# Patient Record
(demographics unavailable — no encounter records)

---

## 2025-03-04 NOTE — BEGINNING OF VISIT
[0] : 2) Feeling down, depressed, or hopeless: Not at all (0) [PHQ-2 Negative] : PHQ-2 Negative [[id number]] : 0 [Pain Scale: ___] : On a scale of 1-10, today the patient's pain is a(n) [unfilled]. [Never] : Never [Date Discussed (MM/DD/YY): ___] : Discussed: [unfilled] [With Patient/Caregiver] : with Patient/Caregiver

## 2025-03-04 NOTE — BEGINNING OF VISIT
[0] : 2) Feeling down, depressed, or hopeless: Not at all (0) [PHQ-2 Negative] : PHQ-2 Negative [[id number]] : 0 [Pain Scale: ___] : On a scale of 1-10, today the patient's pain is a(n) [unfilled]. [Never] : Never [Date Discussed (MM/DD/YY): ___] : Discussed: [unfilled] [With Patient/Caregiver] : with Patient/Caregiver

## 2025-03-04 NOTE — BEGINNING OF VISIT
[0] : 2) Feeling down, depressed, or hopeless: Not at all (0) [PHQ-2 Negative] : PHQ-2 Negative [[id number]] : 0 [Pain Scale: ___] : On a scale of 1-10, today the patient's pain is a(n) [unfilled]. [Never] : Never [Date Discussed (MM/DD/YY): ___] : Discussed: [unfilled] [With Patient/Caregiver] : with Patient/Caregiver

## 2025-03-06 NOTE — HISTORY OF PRESENT ILLNESS
[de-identified] : 22 year old white male referred for mild thrombocytosis present for few years . IN 2018 , he underwent work up for mild non-specific complaints of neck , arm and back pain , mild arthralgias responding to NSAIDs. negative rheumatologic work up and normal ESR . He is currently asymptomatic except for mild knee pain , he is physically active , plays basketball and works out regularly . \par  SYstem review : He denies fever , night sweats , weight loss , diarrhea, abdominal pain , rash , headaches , abnormal bleeding or clotting . cyanosis , erythromelalgia , itching , headaches or migraine , arthritis. visual symptoms or dizziness. hematochezia. \par  FH : non-contributory \par  SH : drinks socially , non-smoker.  [de-identified] : 3/4/2025 Patient returns after a long hiatus referred by Dr Andrea for persistent thrombocytosis , Work upin 20

## 2025-03-06 NOTE — HISTORY OF PRESENT ILLNESS
[de-identified] : 22 year old white male referred for mild thrombocytosis present for few years . IN 2018 , he underwent work up for mild non-specific complaints of neck , arm and back pain , mild arthralgias responding to NSAIDs. negative rheumatologic work up and normal ESR . He is currently asymptomatic except for mild knee pain , he is physically active , plays basketball and works out regularly . \par  SYstem review : He denies fever , night sweats , weight loss , diarrhea, abdominal pain , rash , headaches , abnormal bleeding or clotting . cyanosis , erythromelalgia , itching , headaches or migraine , arthritis. visual symptoms or dizziness. hematochezia. \par  FH : non-contributory \par  SH : drinks socially , non-smoker.  [de-identified] : 3/4/2025 Patient returns after a long hiatus referred by Dr Andrea for persistent thrombocytosis , Work upin 20

## 2025-03-06 NOTE — ASSESSMENT
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  Plan : repeat cbc , CMP , LDH , JAK2            spleen ultrasound          the diagnosis , natural history of the disease was discussed , no indication for cytoreductive treatment at this point in the absence of disease related symptoms , history of thrombosis or bleeding ,will continue to follow expectantly and consider low dose ASA prophylaxis.. Emphasized maintaining healthy life style , mitigate any cardiovascular risk factors including , smoking , hyperlipidemia .

## 2025-03-06 NOTE — HISTORY OF PRESENT ILLNESS
[de-identified] : 22 year old white male referred for mild thrombocytosis present for few years . IN 2018 , he underwent work up for mild non-specific complaints of neck , arm and back pain , mild arthralgias responding to NSAIDs. negative rheumatologic work up and normal ESR . He is currently asymptomatic except for mild knee pain , he is physically active , plays basketball and works out regularly . \par  SYstem review : He denies fever , night sweats , weight loss , diarrhea, abdominal pain , rash , headaches , abnormal bleeding or clotting . cyanosis , erythromelalgia , itching , headaches or migraine , arthritis. visual symptoms or dizziness. hematochezia. \par  FH : non-contributory \par  SH : drinks socially , non-smoker.  [de-identified] : 3/4/2025 Patient returns after a long hiatus referred by Dr Andrea for persistent thrombocytosis , Work upin 20

## 2025-04-08 NOTE — ASSESSMENT
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  spleen ultrasound mild splenomegaly measuring 15.6 cm.  Plan : repeat cbc , CMP , LDH.                   the diagnosis , natural history of the disease was discussed , no indication for cytoreductive treatment at this point in the absence of disease related symptoms , history of thrombosis or bleeding ,will continue to follow expectantly and consider low dose ASA prophylaxis.. Emphasized maintaining healthy life style , mitigate any cardiovascular risk factors including , smoking , hyperlipidemia . Offered to perform a bone marrow biopsy given he was diagnosed with ET at a young age  it will help to determine if there is any Myelofibrosis. He will think about it and will let us know.

## 2025-04-08 NOTE — HISTORY OF PRESENT ILLNESS
[de-identified] : 22 year old white male referred for mild thrombocytosis present for few years . IN 2018 , he underwent work up for mild non-specific complaints of neck , arm and back pain , mild arthralgias responding to NSAIDs. negative rheumatologic work up and normal ESR . He is currently asymptomatic except for mild knee pain , he is physically active , plays basketball and works out regularly . \par  SYstem review : He denies fever , night sweats , weight loss , diarrhea, abdominal pain , rash , headaches , abnormal bleeding or clotting . cyanosis , erythromelalgia , itching , headaches or migraine , arthritis. visual symptoms or dizziness. hematochezia. \par  FH : non-contributory \par  SH : drinks socially , non-smoker.  [de-identified] : 3/4/2025 Patient returns after a long hiatus referred by Dr Andrea for persistent thrombocytosis , Work upin 20  4/8/2025 Patient returns after a long hiatus referred by Dr Andrea for persistent thrombocytosis, Work up in 2022 showed positive JAK2. VAF 19.3. No history of blood clots , MI, Stroke in the past. He feels well, no active complaints.

## 2025-04-08 NOTE — HISTORY OF PRESENT ILLNESS
[de-identified] : 22 year old white male referred for mild thrombocytosis present for few years . IN 2018 , he underwent work up for mild non-specific complaints of neck , arm and back pain , mild arthralgias responding to NSAIDs. negative rheumatologic work up and normal ESR . He is currently asymptomatic except for mild knee pain , he is physically active , plays basketball and works out regularly . \par  SYstem review : He denies fever , night sweats , weight loss , diarrhea, abdominal pain , rash , headaches , abnormal bleeding or clotting . cyanosis , erythromelalgia , itching , headaches or migraine , arthritis. visual symptoms or dizziness. hematochezia. \par  FH : non-contributory \par  SH : drinks socially , non-smoker.  [de-identified] : 3/4/2025 Patient returns after a long hiatus referred by Dr Andrea for persistent thrombocytosis , Work upin 20  4/8/2025 Patient returns after a long hiatus referred by Dr Andrea for persistent thrombocytosis, Work up in 2022 showed positive JAK2. VAF 19.3. No history of blood clots , MI, Stroke in the past. He feels well, no active complaints.

## 2025-04-08 NOTE — HISTORY OF PRESENT ILLNESS
[de-identified] : 22 year old white male referred for mild thrombocytosis present for few years . IN 2018 , he underwent work up for mild non-specific complaints of neck , arm and back pain , mild arthralgias responding to NSAIDs. negative rheumatologic work up and normal ESR . He is currently asymptomatic except for mild knee pain , he is physically active , plays basketball and works out regularly . \par  SYstem review : He denies fever , night sweats , weight loss , diarrhea, abdominal pain , rash , headaches , abnormal bleeding or clotting . cyanosis , erythromelalgia , itching , headaches or migraine , arthritis. visual symptoms or dizziness. hematochezia. \par  FH : non-contributory \par  SH : drinks socially , non-smoker.  [de-identified] : 3/4/2025 Patient returns after a long hiatus referred by Dr Andrea for persistent thrombocytosis , Work upin 20  4/8/2025 Patient returns after a long hiatus referred by Dr Andrea for persistent thrombocytosis, Work up in 2022 showed positive JAK2. VAF 19.3. No history of blood clots , MI, Stroke in the past. He feels well, no active complaints.

## 2025-07-10 NOTE — HISTORY OF PRESENT ILLNESS
[de-identified] : 22 year old white male referred for mild thrombocytosis present for few years . IN 2018 , he underwent work up for mild non-specific complaints of neck , arm and back pain , mild arthralgias responding to NSAIDs. negative rheumatologic work up and normal ESR . He is currently asymptomatic except for mild knee pain , he is physically active , plays basketball and works out regularly . \par  SYstem review : He denies fever , night sweats , weight loss , diarrhea, abdominal pain , rash , headaches , abnormal bleeding or clotting . cyanosis , erythromelalgia , itching , headaches or migraine , arthritis. visual symptoms or dizziness. hematochezia. \par  FH : non-contributory \par  SH : drinks socially , non-smoker.  [de-identified] : 3/4/2025 Patient returns after a long hiatus referred by Dr Andrea for persistent thrombocytosis , Work upin 20  4/8/2025 Patient returns after a long hiatus referred by Dr Andrea for persistent thrombocytosis, Work up in 2022 showed positive JAK2. VAF 19.3. No history of blood clots , MI, Stroke in the past. He feels well, no active complaints.   67/7/25 Patient returns for follow up, patient of Dr. Andrea. Work up in 2022 showed positive JAK2. VAF 19.3. No history of blood clots, MI, Stroke in the past. He feels well, no active complaints.  reporting chest discomfort with exertion. CBC today showing platelet count 470K.

## 2025-07-10 NOTE — HISTORY OF PRESENT ILLNESS
[de-identified] : 22 year old white male referred for mild thrombocytosis present for few years . IN 2018 , he underwent work up for mild non-specific complaints of neck , arm and back pain , mild arthralgias responding to NSAIDs. negative rheumatologic work up and normal ESR . He is currently asymptomatic except for mild knee pain , he is physically active , plays basketball and works out regularly . \par  SYstem review : He denies fever , night sweats , weight loss , diarrhea, abdominal pain , rash , headaches , abnormal bleeding or clotting . cyanosis , erythromelalgia , itching , headaches or migraine , arthritis. visual symptoms or dizziness. hematochezia. \par  FH : non-contributory \par  SH : drinks socially , non-smoker.  [de-identified] : 3/4/2025 Patient returns after a long hiatus referred by Dr Andrea for persistent thrombocytosis , Work upin 20  4/8/2025 Patient returns after a long hiatus referred by Dr Andrea for persistent thrombocytosis, Work up in 2022 showed positive JAK2. VAF 19.3. No history of blood clots , MI, Stroke in the past. He feels well, no active complaints.   67/7/25 Patient returns for follow up, patient of Dr. Andrea. Work up in 2022 showed positive JAK2. VAF 19.3. No history of blood clots, MI, Stroke in the past. He feels well, no active complaints.  reporting chest discomfort with exertion. CBC today showing platelet count 470K.

## 2025-07-10 NOTE — ASSESSMENT
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  spleen ultrasound mild splenomegaly measuring 15.6 cm.  Essential thrombocythemia, Low Risk  The diagnosis , natural history of the disease was discussed , no indication for cytoreductive treatment at this point in the absence of disease related symptoms , history of thrombosis or bleeding ,will continue to follow expectantly and consider low dose ASA prophylaxis. Emphasized maintaining healthy life style , mitigate any cardiovascular risk factors including , smoking , hyperlipidemia . Offered to perform a bone marrow biopsy given he was diagnosed with ET at a young age  it will help to determine if there is any Myelofibrosis. -reporting chest discomfort with exertion, cardiology referral given  - advised to start Aspirin 81 mg QD given he has low risk ET (Age <60, JAK2 [id number], and no history of thrombosis)   Plan :  RTC in 6 months with Dr. Christy,              Repeat cbc, CMP, LDH